# Patient Record
Sex: FEMALE | URBAN - METROPOLITAN AREA
[De-identification: names, ages, dates, MRNs, and addresses within clinical notes are randomized per-mention and may not be internally consistent; named-entity substitution may affect disease eponyms.]

---

## 2023-11-03 ENCOUNTER — OFFICE VISIT (OUTPATIENT)
Age: 63
End: 2023-11-03

## 2023-11-03 VITALS
HEART RATE: 78 BPM | OXYGEN SATURATION: 98 % | RESPIRATION RATE: 18 BRPM | SYSTOLIC BLOOD PRESSURE: 90 MMHG | TEMPERATURE: 98.2 F | DIASTOLIC BLOOD PRESSURE: 60 MMHG

## 2023-11-03 DIAGNOSIS — R42 POSTURAL DIZZINESS: ICD-10-CM

## 2023-11-03 DIAGNOSIS — I95.9 HYPOTENSION, UNSPECIFIED HYPOTENSION TYPE: Primary | ICD-10-CM

## 2023-11-03 RX ORDER — MECLIZINE HYDROCHLORIDE 25 MG/1
25 TABLET ORAL 3 TIMES DAILY PRN
Qty: 21 TABLET | Refills: 0 | Status: SHIPPED | OUTPATIENT
Start: 2023-11-03 | End: 2023-11-10

## 2023-11-03 ASSESSMENT — ENCOUNTER SYMPTOMS
SORE THROAT: 0
VOMITING: 0
BACK PAIN: 0
RHINORRHEA: 0
ABDOMINAL PAIN: 0
NAUSEA: 0
WHEEZING: 0
COUGH: 0
DIARRHEA: 0
SHORTNESS OF BREATH: 0

## 2024-04-12 ENCOUNTER — OFFICE VISIT (OUTPATIENT)
Age: 64
End: 2024-04-12

## 2024-04-12 VITALS
HEART RATE: 86 BPM | WEIGHT: 157 LBS | RESPIRATION RATE: 16 BRPM | OXYGEN SATURATION: 97 % | SYSTOLIC BLOOD PRESSURE: 129 MMHG | TEMPERATURE: 98.4 F | DIASTOLIC BLOOD PRESSURE: 80 MMHG

## 2024-04-12 DIAGNOSIS — K52.9 GASTROENTERITIS: Primary | ICD-10-CM

## 2024-04-12 RX ORDER — ONDANSETRON 4 MG/1
4 TABLET, FILM COATED ORAL 3 TIMES DAILY PRN
Qty: 15 TABLET | Refills: 0 | Status: SHIPPED | OUTPATIENT
Start: 2024-04-12

## 2024-04-12 ASSESSMENT — ENCOUNTER SYMPTOMS
DIARRHEA: 1
ABDOMINAL PAIN: 1
NAUSEA: 1
COUGH: 0
JAUNDICE: 0
VOMITING: 1

## 2024-04-12 NOTE — PROGRESS NOTES
Jolly Joseph (:  1960) is a 63 y.o. female,New patient, here for evaluation of the following chief complaint(s):  GI Problem (Pt here today due to GI upset starting last night pt started with nausea vomiting and loose stool. Was unable to sleep Pt has been able to keep water and tea at home not worried about dehydration just stated that eating solids increases sx. Pt also complaining of intermittent chills last night no fever today )      ASSESSMENT/PLAN:  1. Gastroenteritis    - ondansetron (ZOFRAN) 4 MG tablet; Take 1 tablet by mouth 3 times daily as needed for Nausea or Vomiting  Dispense: 15 tablet; Refill: 0     -rest,increase fluid intake,return to UC or to the ER if worsening symptoms  Return if symptoms worsen or fail to improve.    SUBJECTIVE/OBJECTIVE:  Pt c/o watery diarrhea,vomiting and nausea since last night,feels better,still with nausea      History provided by:  Patient  GI Problem  The primary symptoms include fatigue, abdominal pain (mild cramping,resolved), nausea, vomiting (none since last night) and diarrhea (watery,none since am). Primary symptoms do not include fever, weight loss, jaundice, dysuria, myalgias or rash.       Vitals:    24 1140   BP: 129/80   Site: Right Upper Arm   Position: Sitting   Cuff Size: Medium Adult   Pulse: 86   Resp: 16   Temp: 98.4 °F (36.9 °C)   TempSrc: Oral   SpO2: 97%   Weight: 71.2 kg (157 lb)       Review of Systems   Constitutional:  Positive for fatigue. Negative for fever and weight loss.   Respiratory:  Negative for cough.    Cardiovascular:  Negative for chest pain.   Gastrointestinal:  Positive for abdominal pain (mild cramping,resolved), diarrhea (watery,none since am), nausea and vomiting (none since last night). Negative for jaundice.   Genitourinary:  Negative for dysuria.   Musculoskeletal:  Negative for myalgias.   Skin:  Negative for rash.       Physical Exam  Constitutional:       General: She is not in acute distress.  HENT:

## 2024-06-21 DIAGNOSIS — R42 POSTURAL DIZZINESS: ICD-10-CM

## 2024-06-21 DIAGNOSIS — K52.9 GASTROENTERITIS: ICD-10-CM

## 2024-06-24 RX ORDER — ONDANSETRON 4 MG/1
4 TABLET, FILM COATED ORAL 3 TIMES DAILY PRN
Qty: 15 TABLET | Refills: 0 | OUTPATIENT
Start: 2024-06-24

## 2024-06-24 RX ORDER — MECLIZINE HYDROCHLORIDE 25 MG/1
TABLET ORAL
Qty: 21 TABLET | Refills: 0 | OUTPATIENT
Start: 2024-06-24

## 2024-11-05 ENCOUNTER — OFFICE VISIT (OUTPATIENT)
Age: 64
End: 2024-11-05

## 2024-11-05 VITALS
SYSTOLIC BLOOD PRESSURE: 152 MMHG | DIASTOLIC BLOOD PRESSURE: 94 MMHG | WEIGHT: 157 LBS | HEART RATE: 100 BPM | TEMPERATURE: 98.7 F | BODY MASS INDEX: 26.16 KG/M2 | HEIGHT: 65 IN | OXYGEN SATURATION: 95 %

## 2024-11-05 DIAGNOSIS — B34.9 VIRAL INFECTION: ICD-10-CM

## 2024-11-05 DIAGNOSIS — U07.1 COVID-19: Primary | ICD-10-CM

## 2024-11-05 LAB
Lab: ABNORMAL
PERFORMING INSTRUMENT: ABNORMAL
QC PASS/FAIL: ABNORMAL
SARS-COV-2, POC: DETECTED

## 2024-11-05 RX ORDER — PANTOPRAZOLE SODIUM 20 MG/1
20 TABLET, DELAYED RELEASE ORAL DAILY
COMMUNITY

## 2024-11-05 RX ORDER — ATORVASTATIN CALCIUM 40 MG/1
40 TABLET, FILM COATED ORAL DAILY
COMMUNITY

## 2024-11-05 RX ORDER — ASPIRIN 81 MG/1
81 TABLET ORAL DAILY
COMMUNITY

## 2024-11-05 ASSESSMENT — ENCOUNTER SYMPTOMS
WHEEZING: 0
DIARRHEA: 0
COUGH: 1
SHORTNESS OF BREATH: 0
CHEST TIGHTNESS: 0
SINUS PAIN: 0
EYES NEGATIVE: 1
TROUBLE SWALLOWING: 0
SORE THROAT: 1
VOMITING: 0
RHINORRHEA: 0
SINUS PRESSURE: 0
NAUSEA: 0

## 2024-11-05 NOTE — PATIENT INSTRUCTIONS
YOU TESTED POSITIVE FOR COVID   Please contact people with whom you have been in close contact with in the 2 days before your symptoms began until now.  Tell them that they may have been exposed and should stay at home and monitor for symptoms.  Remain isolated for 5 days minimum, if symptoms does not improve, you may need to isolate for 5 more days.   Wash hands often with soap and water for at least 20 seconds after using the restroom, before eating, or when visibly dirty. Or use hand  with at least 60% alcohol content.   Cover coughs and sneezes and wash your hands afterwards  Wear a mask when around others if possible and try to stay away from others within the same household.  Clean all frequently touched surfaces such as doorknobs, faucets, and cellphones multiple times a day  Monitor symptoms multiple times a day.  Contact a medical provider or return to the Emergency Department if symptoms are worsening, such as difficulty breathing.  For additional information, please visit the Centers for Disease Control and Prevention at https://www.cdc.gov/coronavirus/2019-ncov/index.html#